# Patient Record
Sex: MALE | Race: WHITE | NOT HISPANIC OR LATINO | Employment: FULL TIME | URBAN - METROPOLITAN AREA
[De-identification: names, ages, dates, MRNs, and addresses within clinical notes are randomized per-mention and may not be internally consistent; named-entity substitution may affect disease eponyms.]

---

## 2022-06-07 ENCOUNTER — TREATMENT (OUTPATIENT)
Dept: PHYSICAL THERAPY | Facility: CLINIC | Age: 37
End: 2022-06-07

## 2022-06-07 ENCOUNTER — TRANSCRIBE ORDERS (OUTPATIENT)
Dept: PHYSICAL THERAPY | Facility: CLINIC | Age: 37
End: 2022-06-07

## 2022-06-07 DIAGNOSIS — S61.219A LACERATION OF RIGHT HAND WITH TENDON INVOLVEMENT INCLUDING FINGERS, INITIAL ENCOUNTER: ICD-10-CM

## 2022-06-07 DIAGNOSIS — S61.411A LACERATION OF RIGHT HAND WITH TENDON INVOLVEMENT INCLUDING FINGERS, INITIAL ENCOUNTER: ICD-10-CM

## 2022-06-07 DIAGNOSIS — S66.921A LACERATION OF RIGHT HAND WITH TENDON INVOLVEMENT INCLUDING FINGERS, INITIAL ENCOUNTER: ICD-10-CM

## 2022-06-07 DIAGNOSIS — S61.401A EXTENSOR TENDON LACERATION OF RIGHT HAND WITH OPEN WOUND, INITIAL ENCOUNTER: Primary | ICD-10-CM

## 2022-06-07 DIAGNOSIS — S66.821A EXTENSOR TENDON LACERATION OF RIGHT HAND WITH OPEN WOUND, INITIAL ENCOUNTER: Primary | ICD-10-CM

## 2022-06-07 DIAGNOSIS — Z47.89 ORTHOPEDIC AFTERCARE: ICD-10-CM

## 2022-06-07 PROCEDURE — L3913 HFO W/O JOINTS CF: HCPCS | Performed by: PHYSICAL THERAPIST

## 2022-06-07 NOTE — PROGRESS NOTES
Geovanni Kimble 1985   Diagnosis/ Surgery: Right Index finger and ring finger EDC Lacerations with repair            Date Of Onset: 5/22/2022    Date Of Surgery:5/24/2022    Hand Dominance: Right  History of Present Condition: Right hand went through a piece of glass. Resulting in 100% laceration of the right index finger EDC tendon zone III, and right ring finger EDC tendon zone III. S/P repair, has been sent to PT for post surgical splinting.  Medical/Vocational History/ Medications: PMH see Epic. Works for [employer].    Pain: none    Edema: mild to moderate right index and ring finger PIP joints  Sensibility: WNL   Wound Status:Dorsal index finger at PIP joint and dorsal ring finger at dorsal/ulna PIP joint with surgical wounds healing well.  ROM/ Strength: Not assessed due to surgical precautions.    Splinting:  · Patient was measure and fit with a custom fabricated index finger radial gutter splint immobilizing P1/PIP/P2 with IP joint free. And ring finger finger radial gutter splint immobilizing P1/PIP/P2 with IP joint free  · Patient was instructed in wearing schedule, precautions and care of the splint during this visit.   · Patient was instructed in proper donning/doffing of splint.   · HEP: DIP flexion 20-30x/hour awake in pain free ROM.  Assessment:  · Patient was fitted and appropriate splint was fabricated this date.  · Patient reported that splint was comfortable and had no complications with the fit of the splint.  · Patient was instructed and patient verbalized understanding of precautions, wear and care of the splint.   · Patient demonstrated independent donning/doffing of splint during treatment today.  Goals:  · Patient was fitted properly with appropriate splint for diagnosis  · Patient was educated on precautions, wear schedule and care of splint  · Patient demonstrated independence with donning/doffing of the splint.  · Splint was provided to Protect Healing Structures,  Restrict Mobility, Improve joint alignment.  Plan:  · No additional treatment is required for this patient at this time. The patient is therefore discharged from therapy.  · Patient advised to contact therapist with any additional questions or concerns regarding the fit and function of the splint.  · Patient will be seen for splint issues as needed   · Wear Instructions: Off for hygiene       PT SIGNATURE: Davidson Ervin PT, T  License Number: [id number]  Electronically signed by Davidson Ervin PT, 06/07/22, 1:37 PM EDT

## 2022-07-05 ENCOUNTER — TRANSCRIBE ORDERS (OUTPATIENT)
Dept: PHYSICAL THERAPY | Facility: CLINIC | Age: 37
End: 2022-07-05

## 2022-07-05 DIAGNOSIS — S61.401A EXTENSOR TENDON LACERATION OF RIGHT HAND WITH OPEN WOUND, INITIAL ENCOUNTER: ICD-10-CM

## 2022-07-05 DIAGNOSIS — S61.411A LACERATION OF RIGHT HAND WITH TENDON INVOLVEMENT INCLUDING FINGERS, INITIAL ENCOUNTER: Primary | ICD-10-CM

## 2022-07-05 DIAGNOSIS — S66.921A LACERATION OF RIGHT HAND WITH TENDON INVOLVEMENT INCLUDING FINGERS, INITIAL ENCOUNTER: Primary | ICD-10-CM

## 2022-07-05 DIAGNOSIS — S66.821A EXTENSOR TENDON LACERATION OF RIGHT HAND WITH OPEN WOUND, INITIAL ENCOUNTER: ICD-10-CM

## 2022-07-05 DIAGNOSIS — S61.219A LACERATION OF RIGHT HAND WITH TENDON INVOLVEMENT INCLUDING FINGERS, INITIAL ENCOUNTER: Primary | ICD-10-CM

## 2022-07-06 ENCOUNTER — TREATMENT (OUTPATIENT)
Dept: PHYSICAL THERAPY | Facility: CLINIC | Age: 37
End: 2022-07-06

## 2022-07-06 DIAGNOSIS — S61.411A LACERATION OF RIGHT HAND WITH TENDON INVOLVEMENT INCLUDING FINGERS, INITIAL ENCOUNTER: ICD-10-CM

## 2022-07-06 DIAGNOSIS — S66.821A EXTENSOR TENDON LACERATION OF RIGHT HAND WITH OPEN WOUND, INITIAL ENCOUNTER: Primary | ICD-10-CM

## 2022-07-06 DIAGNOSIS — S61.401A EXTENSOR TENDON LACERATION OF RIGHT HAND WITH OPEN WOUND, INITIAL ENCOUNTER: Primary | ICD-10-CM

## 2022-07-06 DIAGNOSIS — Z47.89 ORTHOPEDIC AFTERCARE: ICD-10-CM

## 2022-07-06 DIAGNOSIS — S61.219A LACERATION OF RIGHT HAND WITH TENDON INVOLVEMENT INCLUDING FINGERS, INITIAL ENCOUNTER: ICD-10-CM

## 2022-07-06 DIAGNOSIS — S66.921A LACERATION OF RIGHT HAND WITH TENDON INVOLVEMENT INCLUDING FINGERS, INITIAL ENCOUNTER: ICD-10-CM

## 2022-07-06 PROCEDURE — 97140 MANUAL THERAPY 1/> REGIONS: CPT | Performed by: PHYSICAL THERAPIST

## 2022-07-06 PROCEDURE — 97161 PT EVAL LOW COMPLEX 20 MIN: CPT | Performed by: PHYSICAL THERAPIST

## 2022-07-06 PROCEDURE — 97110 THERAPEUTIC EXERCISES: CPT | Performed by: PHYSICAL THERAPIST

## 2022-07-07 NOTE — PROGRESS NOTES
Physical Therapy Initial Evaluation and Plan of Care      Patient: Geovanni Kimble   : 1985  Diagnosis/ICD-10 Code:  Extensor tendon laceration of right hand with open wound, initial encounter [S66.821A, S61.401A]  Referring practitioner: Shivani Rea MD  Date of Initial Visit: 2022  Today's Date: 2022  Patient seen for 1 sessions         Visit Diagnoses:    ICD-10-CM ICD-9-CM   1. Extensor tendon laceration of right hand with open wound, initial encounter  S66.821A 882.2    S61.401A    2. Laceration of right hand with tendon involvement including fingers, initial encounter  S61.411A 882.2    S61.219A 883.2    S66.921A    3. Orthopedic aftercare  Z47.89 V54.9       Subjective Questionnaire: QuickDASH: 65.91      Subjective Evaluation    History of Present Illness  Mechanism of injury: R hand went through a piece of glass resulting in laceration of EDC tendons in zone III of index finger and zone III of ring finger.    Quality of life: good    Pain  Current pain ratin  At best pain ratin  At worst pain ratin  Quality: sharp and dull ache  Relieving factors: change in position  Aggravating factors: movement (Moving outside of tolerated ROM)    Treatments  Previous treatment: physical therapy  Current treatment: physical therapy  Patient Goals  Patient goals for therapy: increased motion, decreased pain, increased strength and return to sport/leisure activities (wants to play guitar)             Objective          Observations     Right Wrist/Hand   Positive for adhesive scar, edema and incision.       Active Range of Motion     Left Wrist   Normal active range of motion    Right Wrist   Normal active range of motion    Right Digits   Flexion   Index     PIP: 50 degrees  Ring     PIP: 70 degrees    DIP: 50 degrees  Extension   Index     MCP: 20 degrees  Ring     MCP: 40 degrees    Passive Range of Motion     Left Wrist   Normal passive range of motion    Right Wrist   Normal passive  range of motion    Strength/Myotome Testing     Left Wrist/Hand      (2nd hand position)   Left  strength (2nd hand position) 120 lbs    Thumb Strength  Key/Lateral Pinch     Trial 1: 24 lbs  Tip/Two-Point Pinch     Trial 1: 17 lbs  Palmar/Three-Point Pinch     Trial 1: 24 lbs    Right Wrist/Hand      (2nd hand position)   Right  strength (2nd hand position) 39 lbs    Thumb Strength   Key/Lateral Pinch     Trial 1: 20 lbs  Tip/Two-Point Pinch     Trial 1: 8 lbs  Palmar/Three-Point Pinch     Trial 1: 17 lbs          Assessment & Plan     Assessment  Impairments: abnormal or restricted ROM, activity intolerance and pain with function  Functional Limitations: carrying objects, lifting and uncomfortable because of pain  Assessment details: Pt is a 38yo male who injured his hand cutting it on glass and has since had surgery. Surgery was to repair the patients index and ring fingers extensor tendons on the R hand. Pt has impaired ROM, activity intolerance, impaired physical strength and pain with function. Pt has difficulty carrying objects, performing light activities around the home, and doing usual recreational hobbies.  Prognosis: fair    Goals  Plan Goals: STG to be met in 3 weeks:  1. Pt reports decrease in finger pain by 50%  2. Pt able to play guitar with 75% accuracy  3. Pt reports 40% increase in finger ROM    LTG to be met in 6 weeks:  1. Pt independent with HEP  2. Pt has functional ROM of R index and ring finger  3. Pt returns to normal activities without any complaints    Plan  Therapy options: will be seen for skilled therapy services  Planned modality interventions: cryotherapy, iontophoresis, TENS, thermotherapy (hydrocollator packs), thermotherapy (paraffin bath), traction, ultrasound, hydrotherapy and high voltage pulsed current (pain management)  Planned therapy interventions: manual therapy, neuromuscular re-education, orthotic fitting/training, soft tissue mobilization, strengthening,  stretching, therapeutic activities, joint mobilization, home exercise program, functional ROM exercises, flexibility, fine motor coordination training, compression and ADL retraining  Frequency: 2x week  Duration in weeks: 6  Treatment plan discussed with: patient        Timed:         Manual Therapy:    30     mins  29546;     Therapeutic Exercise:    15     mins  42496;     Neuromuscular Allen:        mins  30883;    Therapeutic Activity:          mins  70720;     Gait Training:           mins  96173;     Ultrasound:          mins  00292;    Ionto                                   mins   73409  Self Care                            mins   85930  Canalith Repos         mins 78413      Un-Timed:  Electrical Stimulation:         mins  24579 ( );  Dry Needling          mins self-pay  Traction          mins 61651  Low Eval     20     Mins  99356  Mod Eval          Mins  32198  High Eval                            Mins  87773        Timed Treatment:   45   mins   Total Treatment:     65   mins      Umer Moran PT Student      PT: Davidson Ervin PT, [facility]     License Number: [id number]  Electronically signed by Davidson Ervin PT , 07/07/22, 1:31 PM EDT    Initial Certification  Certification Period: 10/5/2022  I certify that the therapy services are furnished while this patient is under my care.  The services outlined above are required by this patient, and will be reviewed every 90 days.     PHYSICIAN: ________________________________________________________  Shivani Rea MD        DATE: ____________________________________________________________    Please sign and return via fax to [phone].. Thank you, [facility].

## 2022-07-11 ENCOUNTER — TREATMENT (OUTPATIENT)
Dept: PHYSICAL THERAPY | Facility: CLINIC | Age: 37
End: 2022-07-11

## 2022-07-11 DIAGNOSIS — S61.401S EXTENSOR TENDON LACERATION OF RIGHT HAND WITH OPEN WOUND, SEQUELA: Primary | ICD-10-CM

## 2022-07-11 DIAGNOSIS — S61.219S LACERATION OF RIGHT HAND WITH TENDON INVOLVEMENT INCLUDING FINGERS, SEQUELA: ICD-10-CM

## 2022-07-11 DIAGNOSIS — S61.411S LACERATION OF RIGHT HAND WITH TENDON INVOLVEMENT INCLUDING FINGERS, SEQUELA: ICD-10-CM

## 2022-07-11 DIAGNOSIS — S66.921S LACERATION OF RIGHT HAND WITH TENDON INVOLVEMENT INCLUDING FINGERS, SEQUELA: ICD-10-CM

## 2022-07-11 DIAGNOSIS — S66.821S EXTENSOR TENDON LACERATION OF RIGHT HAND WITH OPEN WOUND, SEQUELA: Primary | ICD-10-CM

## 2022-07-11 DIAGNOSIS — Z47.89 ORTHOPEDIC AFTERCARE: ICD-10-CM

## 2022-07-11 PROCEDURE — 97140 MANUAL THERAPY 1/> REGIONS: CPT | Performed by: PHYSICAL THERAPIST

## 2022-07-11 PROCEDURE — 97110 THERAPEUTIC EXERCISES: CPT | Performed by: PHYSICAL THERAPIST

## 2022-07-11 NOTE — PROGRESS NOTES
Physical Therapy Daily Treatment Note      Patient: Geovanni Kimble   : 1985  Diagnosis/ICD-10 Code:  Extensor tendon laceration of right hand with open wound, sequela [S66.821S, S61.401S]  Referring practitioner: Shivani Rea MD  Date of Initial Visit: Type: THERAPY  Noted: 2022  Today's Date: 2022  Patient seen for 2 sessions         Visit Diagnoses:    ICD-10-CM ICD-9-CM   1. Extensor tendon laceration of right hand with open wound, sequela  S66.821S 905.8    S61.401S    2. Laceration of right hand with tendon involvement including fingers, sequela  S61.411S 905.8    S61.219S     S66.921S    3. Orthopedic aftercare  Z47.89 V54.9       Subjective   Geovanni Kimble reports: ROM is improving but still painful and stiff.    Objective          Observations     Left Wrist/Hand   Positive for edema.       Strength/Myotome Testing     Left Wrist/Hand      (2nd hand position)   Left  strength (2nd hand position) 100 lbs    Right Wrist/Hand      (2nd hand position)   Right  strength (2nd hand position) 83 lbs        See Exercise, Manual, and Modality Logs for complete treatment.       Assessment/Plan  ROM has progressed but pain is still an issue. Pt is in need of continued skilled therapy to address pain in 2nd and 4th digit of R hand. Significant improvement of R  strength.  Progress per Plan of Care and Progress strengthening /stabilization /functional activity           Manual Therapy:    30     mins  99857;  Therapeutic Exercise:    38     mins  12866;     Neuromuscular Allen:        mins  77530;    Therapeutic Activity:          mins  47340;     Gait Training:           mins  99260;     Ultrasound:          mins  75559;    Electrical Stimulation:         mins  54563 ( );  Dry Needling          mins self-pay    Timed Treatment:   68   mins   Total Treatment:     68   mins      Umer Moran PT student     I was present in the PT Department guiding the student by approving,  concurring, and confirming the skilled judgement for all services rendered.     Davidson Ervin, PT  Physical Therapist

## 2022-07-13 ENCOUNTER — TREATMENT (OUTPATIENT)
Dept: PHYSICAL THERAPY | Facility: CLINIC | Age: 37
End: 2022-07-13

## 2022-07-13 DIAGNOSIS — S61.401S EXTENSOR TENDON LACERATION OF RIGHT HAND WITH OPEN WOUND, SEQUELA: Primary | ICD-10-CM

## 2022-07-13 DIAGNOSIS — S66.821S EXTENSOR TENDON LACERATION OF RIGHT HAND WITH OPEN WOUND, SEQUELA: Primary | ICD-10-CM

## 2022-07-13 DIAGNOSIS — S61.219S LACERATION OF RIGHT HAND WITH TENDON INVOLVEMENT INCLUDING FINGERS, SEQUELA: ICD-10-CM

## 2022-07-13 DIAGNOSIS — Z47.89 ORTHOPEDIC AFTERCARE: ICD-10-CM

## 2022-07-13 DIAGNOSIS — S66.921S LACERATION OF RIGHT HAND WITH TENDON INVOLVEMENT INCLUDING FINGERS, SEQUELA: ICD-10-CM

## 2022-07-13 DIAGNOSIS — S61.411S LACERATION OF RIGHT HAND WITH TENDON INVOLVEMENT INCLUDING FINGERS, SEQUELA: ICD-10-CM

## 2022-07-13 PROCEDURE — 97110 THERAPEUTIC EXERCISES: CPT | Performed by: PHYSICAL THERAPIST

## 2022-07-13 PROCEDURE — 97140 MANUAL THERAPY 1/> REGIONS: CPT | Performed by: PHYSICAL THERAPIST

## 2022-07-13 NOTE — PROGRESS NOTES
Physical Therapy Daily Treatment Note      Patient: Geovanni Kimble   : 1985  Diagnosis/ICD-10 Code:  Extensor tendon laceration of right hand with open wound, sequela [S66.821S, S61.401S]  Referring practitioner: Shivani Rea MD  Date of Initial Visit: Type: THERAPY  Noted: 2022  Today's Date: 2022  Patient seen for 3 sessions         Visit Diagnoses:    ICD-10-CM ICD-9-CM   1. Extensor tendon laceration of right hand with open wound, sequela  S66.821S 905.8    S61.401S    2. Laceration of right hand with tendon involvement including fingers, sequela  S61.411S 905.8    S61.219S     S66.921S    3. Orthopedic aftercare  Z47.89 V54.9       Subjective   Geovanni Kimble reports: Some tenderness over the swelling of the joint. ROM and strength continues to improve. Stiffness in morning but gets better throughout the day.    Objective   Still some slight swelling over joints. ROM is WFL and strength is improving.  See Exercise, Manual, and Modality Logs for complete treatment.       Assessment/Plan  ROM is improving but still pain and swelling. Pt is advised to only schedule one appointment for next week to reassess finger function.  Progress per Plan of Care and Progress strengthening /stabilization /functional activity           Manual Therapy:    16     mins  36132;  Therapeutic Exercise:    45     mins  07790;     Neuromuscular Allen:        mins  66843;    Therapeutic Activity:          mins  40141;     Gait Training:           mins  42123;     Ultrasound:          mins  08237;    Electrical Stimulation:         mins  38134 ( );  Dry Needling          mins self-pay    Timed Treatment:   61   mins   Total Treatment:     61   mins      Umer Moran, PT student     I was present in the PT Department guiding the student by approving, concurring, and confirming the skilled judgement for all services rendered.     Davidson Ervin, PT  Physical Therapist

## 2022-07-20 ENCOUNTER — TREATMENT (OUTPATIENT)
Dept: PHYSICAL THERAPY | Facility: CLINIC | Age: 37
End: 2022-07-20

## 2022-07-20 DIAGNOSIS — S66.921S LACERATION OF RIGHT HAND WITH TENDON INVOLVEMENT INCLUDING FINGERS, SEQUELA: ICD-10-CM

## 2022-07-20 DIAGNOSIS — Z47.89 ORTHOPEDIC AFTERCARE: ICD-10-CM

## 2022-07-20 DIAGNOSIS — S61.411S LACERATION OF RIGHT HAND WITH TENDON INVOLVEMENT INCLUDING FINGERS, SEQUELA: ICD-10-CM

## 2022-07-20 DIAGNOSIS — S61.401S EXTENSOR TENDON LACERATION OF RIGHT HAND WITH OPEN WOUND, SEQUELA: Primary | ICD-10-CM

## 2022-07-20 DIAGNOSIS — S61.219S LACERATION OF RIGHT HAND WITH TENDON INVOLVEMENT INCLUDING FINGERS, SEQUELA: ICD-10-CM

## 2022-07-20 DIAGNOSIS — S66.821S EXTENSOR TENDON LACERATION OF RIGHT HAND WITH OPEN WOUND, SEQUELA: Primary | ICD-10-CM

## 2022-07-20 PROCEDURE — 97110 THERAPEUTIC EXERCISES: CPT | Performed by: PHYSICAL THERAPIST

## 2022-07-20 PROCEDURE — 97140 MANUAL THERAPY 1/> REGIONS: CPT | Performed by: PHYSICAL THERAPIST

## 2022-07-20 NOTE — PROGRESS NOTES
Physical Therapy Daily Treatment Note      Patient: Geovanni Kimble   : 1985  Diagnosis/ICD-10 Code:  Extensor tendon laceration of right hand with open wound, sequela [S66.821S, S61.401S]  Referring practitioner: Shivani Rea MD  Date of Initial Visit: Type: THERAPY  Noted: 2022  Today's Date: 2022  Patient seen for 4 sessions         Visit Diagnoses:    ICD-10-CM ICD-9-CM   1. Extensor tendon laceration of right hand with open wound, sequela  S66.821S 905.8    S61.401S    2. Laceration of right hand with tendon involvement including fingers, sequela  S61.411S 905.8    S61.219S     S66.921S    3. Orthopedic aftercare  Z47.89 V54.9       Subjective   Geovanni Kimble reports: finger ROM is a lot better. No pain reported.    Objective   Minor swelling observed in index and ring finger PIP joints. ROM WFL.  See Exercise, Manual, and Modality Logs for complete treatment.       Assessment/Plan  Pt has continued to improve ROM and reduce pain. Did not feel the need to schedule another visit. Pt is independent with HEP and at this time functional goals have been met. Pt no longer needs skilled PT.  Other  Pt will call if symptoms are exacerbated. If we do not hear from then within 30 days they will be discharged.         Manual Therapy:    10     mins  27615;  Therapeutic Exercise:    35     mins  48140;     Neuromuscular Allen:        mins  52965;    Therapeutic Activity:          mins  97267;     Gait Training:           mins  62286;     Ultrasound:          mins  05530;    Electrical Stimulation:         mins  73250 ( );  Dry Needling          mins self-pay    Timed Treatment:   45   mins   Total Treatment:     45   mins      Umer Moran, PT student     I was present in the PT Department guiding the student by approving, concurring, and confirming the skilled judgement for all services rendered.     Davidson Ervin, PT  Physical Therapist